# Patient Record
Sex: FEMALE | Race: BLACK OR AFRICAN AMERICAN | NOT HISPANIC OR LATINO | URBAN - METROPOLITAN AREA
[De-identification: names, ages, dates, MRNs, and addresses within clinical notes are randomized per-mention and may not be internally consistent; named-entity substitution may affect disease eponyms.]

---

## 2018-02-26 NOTE — PATIENT DISCUSSION
Patient educated on [employer] blended vision. Patient understands that the near focal point will be at approximately 20 inches with the first eye. Near vision will be achieved with surgery on the second eye. Patient educated they may experience halos at night that are typically resolved without therapy. Patient educated there is a 1 in 500 chance there will be something about the vision that they do not like. Patient educated there is a 10% chance of needing enhancement after surgery. Patient elects [employer] OS, goal of emmetropia.

## 2018-05-14 ENCOUNTER — IMPORTED ENCOUNTER (OUTPATIENT)
Dept: URBAN - METROPOLITAN AREA CLINIC 9 | Facility: CLINIC | Age: 63
End: 2018-05-14

## 2021-04-27 ENCOUNTER — IMPORTED ENCOUNTER (OUTPATIENT)
Dept: URBAN - METROPOLITAN AREA CLINIC 9 | Facility: CLINIC | Age: 66
End: 2021-04-27

## 2021-04-30 ENCOUNTER — IMPORTED ENCOUNTER (OUTPATIENT)
Dept: URBAN - METROPOLITAN AREA CLINIC 9 | Facility: CLINIC | Age: 66
End: 2021-04-30

## 2021-05-03 ENCOUNTER — IMPORTED ENCOUNTER (OUTPATIENT)
Dept: URBAN - METROPOLITAN AREA CLINIC 9 | Facility: CLINIC | Age: 66
End: 2021-05-03

## 2021-05-03 PROBLEM — H35.3132: Noted: 2021-05-03

## 2021-05-12 ENCOUNTER — IMPORTED ENCOUNTER (OUTPATIENT)
Dept: URBAN - METROPOLITAN AREA CLINIC 9 | Facility: CLINIC | Age: 66
End: 2021-05-12

## 2021-05-19 ENCOUNTER — IMPORTED ENCOUNTER (OUTPATIENT)
Dept: URBAN - METROPOLITAN AREA CLINIC 9 | Facility: CLINIC | Age: 66
End: 2021-05-19

## 2021-05-26 ENCOUNTER — IMPORTED ENCOUNTER (OUTPATIENT)
Dept: URBAN - METROPOLITAN AREA CLINIC 9 | Facility: CLINIC | Age: 66
End: 2021-05-26

## 2021-06-10 ENCOUNTER — IMPORTED ENCOUNTER (OUTPATIENT)
Dept: URBAN - METROPOLITAN AREA CLINIC 9 | Facility: CLINIC | Age: 66
End: 2021-06-10

## 2021-06-24 ENCOUNTER — IMPORTED ENCOUNTER (OUTPATIENT)
Dept: URBAN - METROPOLITAN AREA CLINIC 9 | Facility: CLINIC | Age: 66
End: 2021-06-24

## 2021-10-16 ASSESSMENT — KERATOMETRY
OS_K2POWER_DIOPTERS: 45.25
OS_K1POWER_DIOPTERS: 45.25
OS_AXISANGLE_DEGREES: 99
OD_AXISANGLE_DEGREES: 65
OS_AXISANGLE_DEGREES: 168
OS_K1POWER_DIOPTERS: 45.5
OS_AXISANGLE2_DEGREES: 78
OS_AXISANGLE_DEGREES: 176
OS_K2POWER_DIOPTERS: 45.75
OD_AXISANGLE2_DEGREES: 7
OD_K1POWER_DIOPTERS: 45.75
OD_K1POWER_DIOPTERS: 45
OS_K2POWER_DIOPTERS: 46.25
OS_K1POWER_DIOPTERS: 45.25
OS_K2POWER_DIOPTERS: 45.5
OS_AXISANGLE2_DEGREES: 86
OD_AXISANGLE2_DEGREES: 155
OD_AXISANGLE2_DEGREES: 88
OD_K2POWER_DIOPTERS: 46
OS_AXISANGLE_DEGREES: 180
OS_K1POWER_DIOPTERS: 43.5
OD_K2POWER_DIOPTERS: 46
OD_AXISANGLE_DEGREES: 7
OD_K2POWER_DIOPTERS: 46
OS_AXISANGLE2_DEGREES: 9
OD_AXISANGLE_DEGREES: 97
OD_K2POWER_DIOPTERS: 47
OD_K1POWER_DIOPTERS: 45.5
OD_AXISANGLE2_DEGREES: 97
OS_AXISANGLE2_DEGREES: 90
OD_K1POWER_DIOPTERS: 45.75
OD_AXISANGLE_DEGREES: 178

## 2021-10-16 ASSESSMENT — VISUAL ACUITY
OD_SC: 20/100 - SN
OS_CC: 20/30 -2 SN
OS_SC: 20/40 - SN
OS_SC: 20/40 SN
OD_SC: 20/50 -2 SN
OD_SC: 20/80 SN
OD_SC: 20/40 - SN
OS_SC: 20/40 + SN
OS_SC: 20/40 - SN
OD_CC: 20/20 SN
OD_SC: 20/40 + SN
OD_SC: 20/25 -2 SN
OS_CC: 20/20 SN
OD_CC: 20/40 + SN
OS_SC: 20/20 - SN

## 2021-10-16 ASSESSMENT — TONOMETRY
OD_IOP_MMHG: 18
OD_IOP_MMHG: 17
OS_IOP_MMHG: 10
OD_IOP_MMHG: 12
OD_IOP_MMHG: 15
OD_IOP_MMHG: 17
OS_IOP_MMHG: 13
OD_IOP_MMHG: 17
OS_IOP_MMHG: 21
OS_IOP_MMHG: 17
OS_IOP_MMHG: 42
OS_IOP_MMHG: 19
OS_IOP_MMHG: 15

## 2022-04-20 ENCOUNTER — ESTABLISHED PATIENT (OUTPATIENT)
Dept: URBAN - METROPOLITAN AREA CLINIC 9 | Facility: CLINIC | Age: 67
End: 2022-04-20

## 2022-04-20 DIAGNOSIS — H35.3132: ICD-10-CM

## 2022-04-20 PROCEDURE — 92014 COMPRE OPH EXAM EST PT 1/>: CPT

## 2022-04-20 PROCEDURE — 92134 CPTRZ OPH DX IMG PST SGM RTA: CPT

## 2022-04-20 ASSESSMENT — TONOMETRY
OS_IOP_MMHG: 16
OD_IOP_MMHG: 13

## 2022-04-20 ASSESSMENT — VISUAL ACUITY
OD_SC: 20/20-1
OS_SC: 20/30
OS_PH: 20/25-2

## 2022-06-06 ENCOUNTER — ESTABLISHED PATIENT (OUTPATIENT)
Dept: URBAN - METROPOLITAN AREA CLINIC 9 | Facility: CLINIC | Age: 67
End: 2022-06-06

## 2022-06-06 DIAGNOSIS — H26.493: ICD-10-CM

## 2022-06-06 DIAGNOSIS — H35.3132: ICD-10-CM

## 2022-06-06 DIAGNOSIS — E11.9: ICD-10-CM

## 2022-06-06 PROCEDURE — 92015 DETERMINE REFRACTIVE STATE: CPT

## 2022-06-06 PROCEDURE — 92014 COMPRE OPH EXAM EST PT 1/>: CPT

## 2022-06-06 PROCEDURE — 92134 CPTRZ OPH DX IMG PST SGM RTA: CPT

## 2022-06-06 ASSESSMENT — VISUAL ACUITY
OD_SC: 20/20-1
OU_CC: 20/20
OS_CC: J1
OD_CC: J1+
OU_CC: J1+
OS_SC: 20/20-1
OU_SC: 20/20
OS_CC: 20/20
OD_CC: 20/20

## 2022-06-06 ASSESSMENT — KERATOMETRY
OD_AXISANGLE_DEGREES: 169
OD_K2POWER_DIOPTERS: 47.00
OS_AXISANGLE2_DEGREES: 96
OS_K1POWER_DIOPTERS: 45.50
OS_K2POWER_DIOPTERS: 46.00
OS_AXISANGLE_DEGREES: 006
OD_K1POWER_DIOPTERS: 46.00
OD_AXISANGLE2_DEGREES: 79

## 2022-06-06 ASSESSMENT — TONOMETRY
OS_IOP_MMHG: 12
OD_IOP_MMHG: 15

## 2022-06-30 RX ORDER — SIMVASTATIN 20 MG
TABLET ORAL
COMMUNITY

## 2022-06-30 RX ORDER — PIOGLITAZONEHYDROCHLORIDE 15 MG/1
TABLET ORAL
COMMUNITY
Start: 2018-08-02

## 2022-06-30 RX ORDER — LISINOPRIL 40 MG/1
TABLET ORAL
COMMUNITY

## 2022-06-30 RX ORDER — FLUTICASONE PROPIONATE 50 MCG
SPRAY, SUSPENSION (ML) NASAL
COMMUNITY
Start: 2016-10-27

## 2022-06-30 RX ORDER — DOXAZOSIN 8 MG/1
TABLET ORAL
COMMUNITY

## 2022-06-30 RX ORDER — HYDROCHLOROTHIAZIDE 25 MG/1
TABLET ORAL
COMMUNITY

## 2022-06-30 RX ORDER — METOPROLOL TARTRATE 100 MG/1
TABLET ORAL
COMMUNITY

## 2022-06-30 RX ORDER — ASPIRIN 81 MG/1
TABLET, CHEWABLE ORAL
COMMUNITY

## 2022-09-06 PROBLEM — R10.9 CHRONIC ABDOMINAL PAIN: Status: ACTIVE | Noted: 2022-09-06

## 2022-09-06 PROBLEM — M54.50 CHRONIC LOW BACK PAIN: Status: ACTIVE | Noted: 2022-09-06

## 2022-09-06 PROBLEM — E78.5 HYPERLIPIDEMIA: Status: ACTIVE | Noted: 2022-09-06

## 2022-09-06 PROBLEM — E87.6 CHRONIC HYPOKALEMIA: Status: ACTIVE | Noted: 2022-09-06

## 2022-09-06 PROBLEM — M54.12 CERVICAL RADICULAR PAIN: Status: ACTIVE | Noted: 2022-09-06

## 2022-09-06 PROBLEM — K80.20 CHOLELITHIASIS: Status: ACTIVE | Noted: 2022-09-06

## 2022-09-06 PROBLEM — R51.9 BILATERAL HEADACHES: Status: ACTIVE | Noted: 2022-09-06

## 2022-09-06 PROBLEM — R06.00 DYSPNEA: Status: ACTIVE | Noted: 2022-09-06

## 2022-09-06 PROBLEM — E53.8 VITAMIN B12 DEFICIENCY: Status: ACTIVE | Noted: 2022-09-06

## 2022-09-06 PROBLEM — N95.9 MENOPAUSAL AND POSTMENOPAUSAL DISORDER: Status: ACTIVE | Noted: 2022-09-06

## 2022-09-06 PROBLEM — I10 ESSENTIAL HYPERTENSION: Status: ACTIVE | Noted: 2022-09-06

## 2022-09-06 PROBLEM — J30.9 ALLERGIC RHINITIS: Status: ACTIVE | Noted: 2022-09-06

## 2022-09-06 PROBLEM — G89.29 CHRONIC LOW BACK PAIN: Status: ACTIVE | Noted: 2022-09-06

## 2022-09-06 PROBLEM — G89.29 CHRONIC ABDOMINAL PAIN: Status: ACTIVE | Noted: 2022-09-06

## 2022-09-06 PROBLEM — M25.519 SHOULDER JOINT PAIN: Status: ACTIVE | Noted: 2022-09-06

## 2023-06-28 ENCOUNTER — ESTABLISHED PATIENT (OUTPATIENT)
Dept: URBAN - METROPOLITAN AREA CLINIC 9 | Facility: CLINIC | Age: 68
End: 2023-06-28

## 2023-06-28 DIAGNOSIS — H35.3132: ICD-10-CM

## 2023-06-28 DIAGNOSIS — H26.491: ICD-10-CM

## 2023-06-28 PROCEDURE — 92014 COMPRE OPH EXAM EST PT 1/>: CPT

## 2023-06-28 PROCEDURE — 92250 FUNDUS PHOTOGRAPHY W/I&R: CPT

## 2023-06-28 ASSESSMENT — VISUAL ACUITY
OD_SC: 20/25-2
OS_SC: 20/25-2

## 2023-06-28 ASSESSMENT — TONOMETRY
OS_IOP_MMHG: 16
OD_IOP_MMHG: 17